# Patient Record
Sex: MALE | Race: WHITE | NOT HISPANIC OR LATINO | URBAN - METROPOLITAN AREA
[De-identification: names, ages, dates, MRNs, and addresses within clinical notes are randomized per-mention and may not be internally consistent; named-entity substitution may affect disease eponyms.]

---

## 2017-09-22 ENCOUNTER — OFFICE VISIT (OUTPATIENT)
Dept: ORTHOPEDIC SURGERY | Facility: CLINIC | Age: 67
End: 2017-09-22

## 2017-09-22 VITALS — BODY MASS INDEX: 33.33 KG/M2 | HEIGHT: 69 IN | WEIGHT: 225 LBS | TEMPERATURE: 98.4 F

## 2017-09-22 DIAGNOSIS — M25.511 CHRONIC RIGHT SHOULDER PAIN: Primary | ICD-10-CM

## 2017-09-22 DIAGNOSIS — M25.522 LEFT ELBOW PAIN: ICD-10-CM

## 2017-09-22 DIAGNOSIS — G89.29 CHRONIC RIGHT SHOULDER PAIN: Primary | ICD-10-CM

## 2017-09-22 PROCEDURE — 99202 OFFICE O/P NEW SF 15 MIN: CPT | Performed by: ORTHOPAEDIC SURGERY

## 2017-09-22 PROCEDURE — 73030 X-RAY EXAM OF SHOULDER: CPT | Performed by: ORTHOPAEDIC SURGERY

## 2017-09-22 PROCEDURE — 73070 X-RAY EXAM OF ELBOW: CPT | Performed by: ORTHOPAEDIC SURGERY

## 2017-09-22 RX ORDER — CITALOPRAM 20 MG/1
TABLET ORAL
COMMUNITY
Start: 2017-08-26

## 2017-09-22 RX ORDER — LISINOPRIL 10 MG/1
TABLET ORAL
COMMUNITY
Start: 2017-08-26

## 2017-09-22 RX ORDER — NITROGLYCERIN 0.4 MG/1
TABLET SUBLINGUAL
COMMUNITY
Start: 2017-08-22

## 2017-09-22 RX ORDER — ATORVASTATIN CALCIUM 20 MG/1
TABLET, FILM COATED ORAL
COMMUNITY
Start: 2017-08-22

## 2017-09-22 RX ORDER — ASPIRIN 81 MG/1
81 TABLET ORAL DAILY
COMMUNITY

## 2017-09-25 NOTE — PROGRESS NOTES
Chief complaint: Right shoulder pain and weakness, left hand weakness and numbness    Mr. Flaherty comes in today for follow-up of his right shoulder and left arm.  These are the issues for which I have seen him in the past.  He tells me that his symptoms are unchanged.  He continues to have mild to moderate aching discomfort in the right shoulder.  Rest and anti-inflammatories do help.  He also comes in today for follow-up of his left hand.  He continues to have numbness and weakness.  His symptoms are no better than when I saw him last.  The primary purpose of his visit today is to get his disability paperwork filled out.  He does not wish to pursue any further intervention or treatment for either of these issues at this time.    Vitals:    09/22/17 1357   Temp: 98.4 °F (36.9 °C)         Current Outpatient Prescriptions:   •  aspirin 81 MG EC tablet, Take 81 mg by mouth Daily., Disp: , Rfl:   •  atorvastatin (LIPITOR) 20 MG tablet, , Disp: , Rfl:   •  citalopram (CeleXA) 20 MG tablet, , Disp: , Rfl:   •  lisinopril (PRINIVIL,ZESTRIL) 10 MG tablet, , Disp: , Rfl:   •  metoprolol tartrate (LOPRESSOR) 25 MG tablet, , Disp: , Rfl:   •  nitroglycerin (NITROSTAT) 0.4 MG SL tablet, , Disp: , Rfl:     Past Medical History:   Diagnosis Date   • Heart attack 1998, 2013   • Hypertension        Past Surgical History:   Procedure Laterality Date   • ELBOW PROCEDURE Left 03/2015   • HAND SURGERY Left     CTR   • SHOULDER SURGERY Right 11/2014       Family History   Problem Relation Age of Onset   • Hypertension Mother    • Hypertension Father        Social History     Social History   • Marital status: Unknown     Spouse name: N/A   • Number of children: N/A   • Years of education: N/A     Occupational History   • Not on file.     Social History Main Topics   • Smoking status: Former Smoker     Years: 20.00     Types: Cigarettes     Quit date: 1993   • Smokeless tobacco: Not on file   • Alcohol use No   • Drug use: Defer   •  Sexual activity: Defer     Other Topics Concern   • Not on file     Social History Narrative   • No narrative on file     A 14 point review of systems is reviewed with the patient.  Pertinent positives are listed above.  All others are negative.      He is awake and alert.  He appears in no acute distress.    On exam, the right shoulder appears benign.  He has full motion.  No tenderness.  He has pain and weakness with elevation in the scapular plane.  Good motor and sensory function of the lower arm.  Palpable radial pulse.    The left hand demonstrates atrophy of the first dorsal interosseous.  He has weakness of the hand with abduction of the fingers and FDP to the small.  Sensation is diminished in the ulnar nerve distribution.      AP, scapular Y, and axillary views of the right shoulder are ordered by myself and reviewed to evaluate the patient's complaint.  These are compared to previous x-rays.  The x-rays show mild glenohumeral osteoarthritis.  The acromiohumeral interval is normal.  Glenoid version appears normal as well.    AP and lateral views of the left elbow are also ordered and reviewed.  These are compared to previous x-rays.  He has mild ulnohumeral osteoarthritis but no other significant findings.    Assessment: 1.  Right rotator cuff tear  2.  Chronic left ulnar nerve palsy    Plan: We discussed his options.  He has no interest in pursuing surgical options for the right shoulder and, unfortunately, there is not much that I can offer him for the chronic left ulnar nerve palsy.  He requested that I fill out his disability paperwork.  I did that for him today.  Going forward, he will follow-up as needed.    Tacho Jones MD

## 2018-01-18 ENCOUNTER — TELEPHONE (OUTPATIENT)
Dept: ORTHOPEDIC SURGERY | Facility: CLINIC | Age: 68
End: 2018-01-18

## 2018-01-29 ENCOUNTER — TELEPHONE (OUTPATIENT)
Dept: ORTHOPEDIC SURGERY | Facility: CLINIC | Age: 68
End: 2018-01-29

## 2022-07-13 ENCOUNTER — HOSPITAL ENCOUNTER (EMERGENCY)
Dept: HOSPITAL 49 - FER | Age: 72
Discharge: HOME | End: 2022-07-13
Payer: COMMERCIAL

## 2022-07-13 DIAGNOSIS — S61.211A: Primary | ICD-10-CM

## 2022-07-13 DIAGNOSIS — I10: ICD-10-CM
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
## 2022-07-13 DIAGNOSIS — Z23: ICD-10-CM
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
## 2022-07-13 DIAGNOSIS — Z79.899: ICD-10-CM

## 2022-08-02 ENCOUNTER — HOSPITAL ENCOUNTER (EMERGENCY)
Dept: HOSPITAL 49 - FER | Age: 72
Discharge: HOME | End: 2022-08-02
Payer: COMMERCIAL

## 2022-08-02 DIAGNOSIS — R41.0: Primary | ICD-10-CM

## 2022-08-02 DIAGNOSIS — I25.2: ICD-10-CM

## 2022-08-02 DIAGNOSIS — Z79.899: ICD-10-CM

## 2022-08-02 DIAGNOSIS — I10: ICD-10-CM

## 2022-08-02 LAB
ALBUMIN SERPL-MCNC: 3.8 G/DL (ref 3.4–5)
ALKALINE PHOSHATASE: 78 U/L (ref 46–116)
ALT SERPL-CCNC: 33 U/L (ref 16–63)
AST: 23 U/L (ref 15–37)
BASOPHIL: 0.3 % (ref 0–2)
BILIRUBIN - TOTAL: 0.4 MG/DL (ref 0.2–1)
BILIRUBIN: NEGATIVE MG/DL
BLOOD: NEGATIVE ERY/UL
BUN SERPL-MCNC: 17 MG/DL (ref 7–18)
BUN/CREAT RATIO (CALC): 21.5 RATIO
CHLORIDE: 104 MMOL/L (ref 98–107)
CLARITY UR: CLEAR
CO2 (BICARBONATE): 26 MMOL/L (ref 21–32)
COLOR: YELLOW
CREATININE: 0.79 MG/DL (ref 0.67–1.17)
EOSINOPHIL: 0.7 % (ref 0–7)
GLOBULIN (CALCULATION): 2.6 G/DL
GLUCOSE (U): NORMAL MG/DL
GLUCOSE SERPL-MCNC: 93 MG/DL (ref 74–106)
HCT: 42.9 % (ref 42–52)
HGB BLD-MCNC: 14.1 G/DL (ref 13.2–18)
LEUKOCYTES: NEGATIVE LEU/UL
LYMPHOCYTE: 22 % (ref 15–48)
MCH RBC QN AUTO: 30.2 PG (ref 25–31)
MCHC RBC AUTO-ENTMCNC: 32.9 G/DL (ref 32–36)
MCV: 91.9 FL (ref 78–100)
MONOCYTE: 7.3 % (ref 0–12)
MPV: 11 FL (ref 6–9.5)
NEUTROPHIL: 69.4 % (ref 41–80)
NITRITE: NEGATIVE MG/DL
NRBC: 0
PLT: 190 K/UL (ref 150–400)
POTASSIUM: 4.2 MMOL/L (ref 3.5–5.1)
PROTEIN: NEGATIVE MG/DL
RBC MORPHOLOGY: NORMAL
RBC: 4.67 M/UL (ref 4.7–6)
RDW: 13.2 % (ref 11.5–14)
SPECIFIC GRAVITY: 1.02 (ref 1–1.03)
TOTAL PROTEIN: 6.4 G/DL (ref 6.4–8.2)
UROBILINOGEN: 0.2 MG/DL (ref 0.2–1)
WBC: 7.5 K/UL (ref 4–10.5)

## 2023-04-03 ENCOUNTER — TRANSCRIBE ORDERS (OUTPATIENT)
Dept: ADMINISTRATIVE | Facility: HOSPITAL | Age: 73
End: 2023-04-03
Payer: COMMERCIAL

## 2023-04-03 DIAGNOSIS — M79.602 LEFT ARM PAIN: ICD-10-CM

## 2023-04-03 DIAGNOSIS — S46.119A STRAIN OF MUSCLE, FASCIA AND TENDON OF LONG HEAD OF BICEPS, UNSPECIFIED ARM, INITIAL ENCOUNTER: Primary | ICD-10-CM

## 2023-04-13 ENCOUNTER — HOSPITAL ENCOUNTER (OUTPATIENT)
Dept: MRI IMAGING | Facility: HOSPITAL | Age: 73
Discharge: HOME | End: 2023-04-13
Admitting: NURSE PRACTITIONER
Payer: OTHER MISCELLANEOUS

## 2023-04-13 DIAGNOSIS — S46.119A STRAIN OF MUSCLE, FASCIA AND TENDON OF LONG HEAD OF BICEPS, UNSPECIFIED ARM, INITIAL ENCOUNTER: ICD-10-CM

## 2023-04-13 DIAGNOSIS — M79.602 LEFT ARM PAIN: ICD-10-CM

## 2023-04-13 PROCEDURE — 73221 MRI JOINT UPR EXTREM W/O DYE: CPT

## 2023-04-24 ENCOUNTER — OFFICE VISIT (OUTPATIENT)
Dept: ORTHOPEDIC SURGERY | Facility: CLINIC | Age: 73
End: 2023-04-24
Payer: OTHER MISCELLANEOUS

## 2023-04-24 VITALS — HEIGHT: 69 IN | WEIGHT: 221 LBS | TEMPERATURE: 97.4 F | BODY MASS INDEX: 32.73 KG/M2

## 2023-04-24 DIAGNOSIS — M75.102 TEAR OF LEFT ROTATOR CUFF, UNSPECIFIED TEAR EXTENT, UNSPECIFIED WHETHER TRAUMATIC: Primary | ICD-10-CM

## 2023-04-24 RX ORDER — LIDOCAINE HYDROCHLORIDE 10 MG/ML
2 INJECTION, SOLUTION EPIDURAL; INFILTRATION; INTRACAUDAL; PERINEURAL
Status: COMPLETED | OUTPATIENT
Start: 2023-04-24 | End: 2023-04-24

## 2023-04-24 RX ORDER — LORATADINE 10 MG/1
10 TABLET ORAL DAILY
COMMUNITY

## 2023-04-24 RX ORDER — METHYLPREDNISOLONE ACETATE 80 MG/ML
80 INJECTION, SUSPENSION INTRA-ARTICULAR; INTRALESIONAL; INTRAMUSCULAR; SOFT TISSUE
Status: COMPLETED | OUTPATIENT
Start: 2023-04-24 | End: 2023-04-24

## 2023-04-24 RX ADMIN — LIDOCAINE HYDROCHLORIDE 2 ML: 10 INJECTION, SOLUTION EPIDURAL; INFILTRATION; INTRACAUDAL; PERINEURAL at 13:02

## 2023-04-24 RX ADMIN — METHYLPREDNISOLONE ACETATE 80 MG: 80 INJECTION, SUSPENSION INTRA-ARTICULAR; INTRALESIONAL; INTRAMUSCULAR; SOFT TISSUE at 13:02

## 2023-04-24 NOTE — PROGRESS NOTES
Patient: Kwesi Flaherty    YOB: 1950    Medical Record Number: [id number]    Chief Complaints:  Left shoulder injury    History of Present Illness:     72 y.o. male patient who presents with a complaint of left shoulder pain and weakness. He reports that he lifted a 20 pound box at work when he felt a sharp stabbing pain in his left shoulder.  The injury happened on .  The shoulder has continued to bother him ever since.  He recently had an MRI.  He has that study available for review today.  Current pain is moderate to severe, constant and throbbing.  He reports weakness whenever he tries to reach or lift.  He reports night pain.    Allergies: No Known Allergies    Home Medications:    Current Outpatient Medications:   •  aspirin 81 MG EC tablet, Take 1 tablet by mouth Daily., Disp: , Rfl:   •  atorvastatin (LIPITOR) 20 MG tablet, , Disp: , Rfl:   •  citalopram (CeleXA) 20 MG tablet, , Disp: , Rfl:   •  lisinopril (PRINIVIL,ZESTRIL) 10 MG tablet, , Disp: , Rfl:   •  loratadine (CLARITIN) 10 MG tablet, Take 1 tablet by mouth Daily., Disp: , Rfl:   •  metoprolol tartrate (LOPRESSOR) 25 MG tablet, , Disp: , Rfl:   •  nitroglycerin (NITROSTAT) 0.4 MG SL tablet, , Disp: , Rfl:     Past Medical History:   Diagnosis Date   • Heart attack ,    • Hypertension        Past Surgical History:   Procedure Laterality Date   • ELBOW PROCEDURE Left 2015   • HAND SURGERY Left     CTR   • SHOULDER SURGERY Right 2014       Social History     Occupational History   • Not on file   Tobacco Use   • Smoking status: Former     Packs/day: 1.00     Years: 20.00     Pack years: 20.00     Types: Cigarettes     Start date:      Quit date:      Years since quittin.3     Passive exposure: Past   • Smokeless tobacco: Never   Vaping Use   • Vaping Use: Never used   Substance and Sexual Activity   • Alcohol use: No   • Drug use: Defer   • Sexual activity: Defer      Social History     Social History  "Narrative   • Not on file       Family History   Problem Relation Age of Onset   • Hypertension Mother    • Hypertension Father        Review of Systems:      Constitutional: Denies fever, shaking or chills   Eyes: Denies change in visual acuity   HEENT: Denies nasal congestion or sore throat   Respiratory: Denies cough or shortness of breath   Cardiovascular: Denies chest pain or edema  Endocrine: Denies tremors, palpitations, intolerance of heat or cold, polyuria, polydipsia.  GI: Denies abdominal pain, nausea, vomiting, bloody stools or diarrhea  : Denies frequency, urgency, incontinence, retention, or nocturia.  Musculoskeletal: Denies numbness, tingling or loss of motor function except as above  Integument: Denies rash, lesion or ulceration   Neurologic: Denies headache or focal weakness, deficits  Heme: Denies spontaneous or excessive bleeding, epistaxis, hematuria, melena, fatigue, enlarged or tender lymph nodes.      All other pertinent positives and negatives as noted above in HPI.    Physical Exam:   72 y.o. male    Vitals:    04/24/23 1156   Temp: 97.4 °F (36.3 °C)   Weight: 100 kg (221 lb)   Height: 175.3 cm (69\")     General:  Patient is awake and alert.  Appears in no acute distress or discomfort.    Psych:  Affect and demeanor are appropriate.    Eyes:  Conjunctiva and sclera appear grossly normal.  Eyes track well and EOM seem to be intact.    Ears:  No gross abnormalities.  Hearing adequate for the exam.    Cardiovascular:  Regular rate and rhythm.    Lungs:  Good chest expansion.  Breathing unlabored.    Lymph:  No palpable adenopathy about neck or axilla.    Neck:  Supple.  Normal ROM.  Negative Spurling's for shoulder or arm pain.    Left upper extremity:  Skin is benign.  No gross abnormalities on inspection including any atrophy, swellings, or masses.  No palpable masses or adenopathy.  Moderate tenderness over the upper biceps groove.  Full shoulder motion.  No evident instability or " apprehension.  Mildly positive Neer Valenzuela. Weakness with forward elevation in the scapular plane.  Positive belly press and bear hugger's.  Good strength in his deltoid, biceps, triceps, and .  Intact sensation throughout the arm.  Brisk cap refill.  Palpable radial pulse.  Good skin turgor.         Radiology:   MRI left shoulder is reviewed along with the associated report.  I have independently interpreted the images.  Findings are listed below.  He has a large rotator cuff tear involving the supraspinatus and subscapularis.  There does not appear to be much atrophy.  He does have moderate glenohumeral arthritis.     IMPRESSION:                 1. Rotator cuff tendinosis with complete retracted tear of the supraspinatus tendon with partial   articular sided tearing of the superior to mid fibers of the subscapularis tendon.  2. Complete retracted tear of the biceps tendon.  3. Mild acromioclavicular and moderate glenohumeral osteoarthritis.  4. Moderate-sized joint effusion.  5. Mild pan labral degenerative tearing with no evidence of a focal displaced tear.    6. Subcoracoid bursitis.    Assessment/Plan:   Left rotator cuff tear with associated osteoarthritis    I showed him the MRI and we discussed the options.  We are going to try treating this conservatively but if that fails we may have to consider an arthroplasty.  I recommend we try an injection today.  The risk, benefits and alternatives were discussed.  He consented and the injection was formed as described below.  I have referred him to PT as well.  I want to see him back in 6 weeks to check his progress.  I am going to keep him on work restrictions in the interim.    Tacho Jones MD    04/24/2023    CC to Emil Jones MD     Large Joint Arthrocentesis: L subacromial bursa  Date/Time: 4/24/2023 1:02 PM  Consent given by: patient  Site marked: site marked  Timeout: Immediately prior to procedure a time out was called to verify the correct  patient, procedure, equipment, support staff and site/side marked as required   Supporting Documentation  Indications: pain   Procedure Details  Location: shoulder - L subacromial bursa  Preparation: Patient was prepped and draped in the usual sterile fashion  Needle gauge: 21 G.  Approach: posterior  Medications administered: 80 mg methylPREDNISolone acetate 80 MG/ML; 2 mL lidocaine PF 1% 1 %  Patient tolerance: patient tolerated the procedure well with no immediate complications

## 2023-04-24 NOTE — LETTER
April 24, 2023     Patient: Kwesi Flaherty   YOB: 1950   Date of Visit: 4/24/2023       To Whom It May Concern:    It is my medical opinion that Kwesi Flaherty may return to light duty immediately with the following restrictions: no pushing, pulling or lifting with the left arm .           Sincerely,        Tacho Jones MD    CC:   No Recipients

## 2023-04-25 ENCOUNTER — TELEPHONE (OUTPATIENT)
Dept: ORTHOPEDIC SURGERY | Facility: CLINIC | Age: 73
End: 2023-04-25

## 2023-04-25 NOTE — TELEPHONE ENCOUNTER
Provider:  DR. RALPH CASTELLANO  Caller:  LIANE MCCALL  Relationship to Patient: SELF    Phone Number:  [phone]  Reason for Call:  PATIENT SAYS HE SPOKE WITH HIS WORK COMP  AND SHE TOLD HIM THAT IF DR. CASTELLANO THINKS THAT SURGERY IS THE ONLY REAL SOLUTION TO HIS LEFT SHOULDER PROBLEM THEN DR. CASTELLANO CAN SEND RECOMMENDATION TO WORK COMP TO REQUEST SURGERY AND BYPASS THERAPY ETC.  When was the patient last seen:  4/24/23

## 2023-04-25 NOTE — TELEPHONE ENCOUNTER
Caller: LIANE MCCALL    Relationship: PATIENT    Best call back number: [phone]    What orders are you requesting (i.e. lab or imaging):  PT ORDER IN [facility] 4/24/23.       Where will you receive your lab/imaging services: PATIENT WOULD LIKE ORDER FAXED TO [facility] @ [phone]    Additional notes:  LOCATION MORE CONVENIENT FOR PATIENT

## 2023-04-27 ENCOUNTER — TELEPHONE (OUTPATIENT)
Dept: ORTHOPEDIC SURGERY | Facility: CLINIC | Age: 73
End: 2023-04-27
Payer: COMMERCIAL

## 2023-04-27 NOTE — TELEPHONE ENCOUNTER
I CALLED AND LEFT A MESSAGE FOR PATIENT THAT ON A REVISION PATIENT HAS TO COMPLETE PHYSICAL THERAPY , THIS NOT A TOTAL REPLACEMENT, IT IS A REVISION AND IN MY EXPERIENCE ALL CONSERVATIVE MEASURES HAVE TO TRIED BEFORE WC WILL APPROVE A REVISION,  IF YOU CAN GET IN WRITING THE ADJUSTOR WILL APPROVE THIS I WILL BE GLAD TO HAVE DR CASTELLANO'S SURGERY SCHEDULER TO SCHEDULE THE SURGERY, PLEASE CALL ME ON Monday AND LET ME KNOW,LLR

## 2023-05-01 NOTE — TELEPHONE ENCOUNTER
PT RETURNED A CALL FROM [employer] REGARDING PREVIOUS MESSAGE. UNABLE TO WARM TRANSFER. PLEASE CALL PATIENT [phone].

## 2023-05-23 ENCOUNTER — TELEPHONE (OUTPATIENT)
Dept: ORTHOPEDIC SURGERY | Facility: CLINIC | Age: 73
End: 2023-05-23
Payer: COMMERCIAL

## 2023-05-23 NOTE — TELEPHONE ENCOUNTER
"PATIENT CALLED AND WANTED TO KNOW IF YOU WOULD SPEAK WITH HIM PERSONALLY FOR A COUPLE OF MINUTES. APPARENTLY HE IS ON LIGHT DUTY RESTRICTIONS AT WORK AND THEY ARE NOW TELLING HIM THEY CANT GIVE HIM ANY HOURS BECAUSE THERE ARE NO LIGHT DUTIES FOR HIM. 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 I DO SEE A NOTE IN HIS CHART FROM YOU SAYING HE SHOULD BE ON LIGHT DUTY. THIS PATIENT SOUNDS VERY CONCERNED AND UPSET. HE IS WILLING TO COME TALK TO YOU TOMORROW AT  IF YOU COULD MAKE TIME FOR HIM.HE THANKED ME FOR RELAYING THE MESSAGE. PLEASE ADVISE  "

## 2023-05-24 ENCOUNTER — TELEPHONE (OUTPATIENT)
Dept: ORTHOPEDIC SURGERY | Facility: HOSPITAL | Age: 73
End: 2023-05-24
Payer: COMMERCIAL

## 2023-06-05 ENCOUNTER — TELEPHONE (OUTPATIENT)
Dept: ORTHOPEDIC SURGERY | Facility: CLINIC | Age: 73
End: 2023-06-05

## 2023-06-05 ENCOUNTER — PREP FOR SURGERY (OUTPATIENT)
Dept: ORTHOPEDIC SURGERY | Facility: CLINIC | Age: 73
End: 2023-06-05

## 2023-06-05 ENCOUNTER — OFFICE VISIT (OUTPATIENT)
Dept: ORTHOPEDIC SURGERY | Facility: CLINIC | Age: 73
End: 2023-06-05
Payer: OTHER MISCELLANEOUS

## 2023-06-05 VITALS — BODY MASS INDEX: 32.44 KG/M2 | WEIGHT: 226.6 LBS | TEMPERATURE: 98 F | HEIGHT: 70 IN

## 2023-06-05 DIAGNOSIS — M75.102 TEAR OF LEFT ROTATOR CUFF, UNSPECIFIED TEAR EXTENT, UNSPECIFIED WHETHER TRAUMATIC: Primary | ICD-10-CM

## 2023-06-05 PROCEDURE — 99214 OFFICE O/P EST MOD 30 MIN: CPT | Performed by: ORTHOPAEDIC SURGERY

## 2023-06-05 RX ORDER — MELOXICAM 7.5 MG/1
15 TABLET ORAL ONCE
OUTPATIENT
Start: 2023-06-05 | End: 2023-06-05

## 2023-06-05 RX ORDER — ACETAMINOPHEN 325 MG/1
1000 TABLET ORAL ONCE
OUTPATIENT
Start: 2023-06-05 | End: 2023-06-05

## 2023-06-05 RX ORDER — PREGABALIN 150 MG/1
150 CAPSULE ORAL ONCE
OUTPATIENT
Start: 2023-06-05 | End: 2023-06-05

## 2023-06-05 NOTE — PROGRESS NOTES
Patient:Kwesi Flaherty    YOB: 1950    Medical Record Number:[id number]    Chief Complaints: Follow-up left shoulder pain    History of Present Illness:     73 y.o. male patient who presents for follow up of his left shoulder.  He completed formal physical therapy.  It has not helped.  He says that his therapist told him that they were not making any progress.  He reports constant severe pain.  He reports inability to reach or lift overhead without severe pain.  He reports night pain and trouble sleeping.  He is very frustrated.    Allergies:No Known Allergies    Home Medications:    Current Outpatient Medications:     aspirin 81 MG EC tablet, Take 1 tablet by mouth Daily., Disp: , Rfl:     atorvastatin (LIPITOR) 20 MG tablet, , Disp: , Rfl:     citalopram (CeleXA) 20 MG tablet, , Disp: , Rfl:     lisinopril (PRINIVIL,ZESTRIL) 10 MG tablet, , Disp: , Rfl:     loratadine (CLARITIN) 10 MG tablet, Take 1 tablet by mouth Daily., Disp: , Rfl:     metoprolol tartrate (LOPRESSOR) 25 MG tablet, , Disp: , Rfl:     nitroglycerin (NITROSTAT) 0.4 MG SL tablet, , Disp: , Rfl:     Past Medical History:   Diagnosis Date    Heart attack ,     Hypertension        Past Surgical History:   Procedure Laterality Date    ELBOW PROCEDURE Left 2015    HAND SURGERY Left     CTR    SHOULDER SURGERY Right 2014       Social History     Occupational History    Not on file   Tobacco Use    Smoking status: Former     Packs/day: 1.00     Years: 20.00     Pack years: 20.00     Types: Cigarettes     Start date:      Quit date:      Years since quittin.4     Passive exposure: Past    Smokeless tobacco: Never   Vaping Use    Vaping Use: Never used   Substance and Sexual Activity    Alcohol use: No    Drug use: Never    Sexual activity: Defer      Social History     Social History Narrative    Not on file       Family History   Problem Relation Age of Onset    Hypertension Mother     Hypertension Father   "      Review of Systems:      Constitutional: Denies fever, shaking or chills   Eyes: Denies change in visual acuity   HEENT: Denies nasal congestion or sore throat   Respiratory: Denies cough or shortness of breath   Cardiovascular: Denies chest pain or edema  Endocrine: Denies tremors, palpitations, intolerance of heat or cold, polyuria, polydipsia.  GI: Denies abdominal pain, nausea, vomiting, bloody stools or diarrhea  : Denies frequency, urgency, incontinence, retention, or nocturia.  Musculoskeletal: Denies numbness, tingling or loss of motor function except as above  Integument: Denies rash, lesion or ulceration   Neurologic: Denies headache or focal weakness, deficits  Heme: Denies spontaneous or excessive bleeding, epistaxis, hematuria, melena, fatigue, enlarged or tender lymph nodes.      All other pertinent positives and negatives as noted above in HPI.    Physical Exam:73 y.o. male  Vitals:    06/05/23 0823   Temp: 98 °F (36.7 °C)   Weight: 103 kg (226 lb 9.6 oz)   Height: 177.8 cm (70\")       General:  Patient is awake and alert.  Appears in no acute distress or discomfort.    Psych:  Affect and demeanor are appropriate.    Extremities: Left shoulder is examined.  Skin is benign.  Mild to moderate anterior tenderness.  No effusion.  He has good motion.  He has weakness with forward elevation in the scapular plane and abduction.  Positive belly press and bear hugger's.  Deltoid fires.  Normal axillary nerve sensation.    Imaging: AP, scapular Y and axial views left shoulder are ordered and reviewed to evaluate his complaint.  No comparison films are immediately available.  He has a small cyst in the caudal glenoid and some early osteophyte formation there.  No other significant findings noted.  Acromiohumeral interval is normal.    MRI left shoulder is reviewed along with the associated report.  He has a full-thickness tear of the supraspinatus and some associated cuff tendinopathy.  He appears to have " moderate glenohumeral arthritis.  No other significant findings.    Assessment/Plan: Left rotator cuff tear with associated osteoarthritis    I showed him his x-rays and MRI.  In my opinion he is really kind about an in between point.  We could potentially try a repair.  He does have some arthritis and even if the repair heals, the arthritis may continue to cause him some pain.  The alternative option is a reverse.  I feel like that is probably a little aggressive but it is more predictable in terms of addressing both the tear and the arthritis.    We had a long discussion about both options.  We talked about the pros and cons of each.  He went through a repair on the right side and it was a very difficult recovery.  He is still having some problems with the right and he does not want to go through that with the left.  He wants to pursue the replacement.  Given his failure of conservative treatment, I do consider that the reverse arthroplasty is reasonable for him.  The risk, benefits and alternatives were thoroughly discussed.  He acknowledged understanding of this information.  We will try to get this approved.  I will then have my  contact him to set this up.  He will follow-up with myself or Radha for a preoperative visit.    Tacho Jones MD    06/05/2023

## 2023-06-05 NOTE — TELEPHONE ENCOUNTER
Caller: Kwesi Flaherty    Relationship to patient: Self    Best call back number: [phone]    Chief complaint: LEFT SHOULDER PAIN    Type of visit: SCHEDULE SX, IF W/C APPROVES FOR REPLACEMENT    Requested date: ASAP      Additional notes: PATIENT HAD APPT TODAY W. DR. CASTELLANO. HE CALLED BACK IN TO STATE HE IS READY TO HAVE SHOULDER REPLACEMENT, IF W/C APPROVES PROCEDURE. PLEASE CALL BACK TO ADVISE

## 2023-06-06 NOTE — TELEPHONE ENCOUNTER
Caller: Kwesi Flaherty    Relationship to patient: Self    Best call back number: [phone]    Patient is needing: PATIENT CALLING BACK SAYING HE JUST MISSED A CALL, ASSUMING IT WAS FOR SX SCHEDULING.

## 2023-06-08 ENCOUNTER — TELEPHONE (OUTPATIENT)
Dept: ORTHOPEDIC SURGERY | Facility: CLINIC | Age: 73
End: 2023-06-08

## 2023-08-07 ENCOUNTER — OFFICE VISIT (OUTPATIENT)
Dept: ORTHOPEDIC SURGERY | Facility: CLINIC | Age: 73
End: 2023-08-07
Payer: OTHER MISCELLANEOUS

## 2023-08-07 VITALS — HEIGHT: 70 IN | WEIGHT: 222.6 LBS | BODY MASS INDEX: 31.87 KG/M2 | TEMPERATURE: 98 F

## 2023-08-07 DIAGNOSIS — M75.102 TEAR OF LEFT ROTATOR CUFF, UNSPECIFIED TEAR EXTENT, UNSPECIFIED WHETHER TRAUMATIC: Primary | ICD-10-CM

## 2023-08-07 DIAGNOSIS — Z09 SURGERY FOLLOW-UP: ICD-10-CM

## 2023-08-07 NOTE — LETTER
August 7, 2023     Patient: Kwesi Flaherty   YOB: 1950   Date of Visit: 8/7/2023       To Whom It May Concern:    It is my medical opinion that Kwesi Flaherty should remain out of work until next office visit .           Sincerely,        Tacho Jones MD    CC:   No Recipients

## 2023-08-07 NOTE — PROGRESS NOTES
"Kwesi Flaherty : 1950 MRN: [id number] DATE: 2023    DIAGNOSIS: 6 week follow up left shoulder arthroplasty      SUBJECTIVE:  Mr. Flaherty returns today for 6 week follow up of left shoulder replacement. He reports doing well with no unusual complaints.     OBJECTIVE:    Temp 98 øF (36.7 øC)   Ht 177.8 cm (70\")   Wt 101 kg (222 lb 9.6 oz)   BMI 31.94 kg/mý     Exam: The incision is well healed. No erythema or drainage. Shoulder moves fluidly with pendulums.  Motion is on track per protocol.  The arm is soft and nontender.  Good motor and sensory function.  Palpable distal pulses.     DIAGNOSTIC STUDIES    Xrays: AP and scapular Y views of the left shoulder are ordered and reviewed for evaluation of shoulder replacement.  They demonstrate a well positioned, well aligned replacement without complicating factors noted.  In comparison with previous films there has been no change.    ASSESSMENT: 6 week follow up left shoulder replacement    PLAN:   1.  Continue PT per protocol.  2.  Discontinue sling and begin working on progressing ROM as tolerated.  3.  Counseled patient about appropriate activity modifications and restrictions.  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    Tacho Jones MD    2023    "

## 2023-09-18 ENCOUNTER — OFFICE VISIT (OUTPATIENT)
Dept: ORTHOPEDIC SURGERY | Facility: CLINIC | Age: 73
End: 2023-09-18
Payer: OTHER MISCELLANEOUS

## 2023-09-18 VITALS — HEIGHT: 70 IN | WEIGHT: 225.6 LBS | BODY MASS INDEX: 32.3 KG/M2 | TEMPERATURE: 97.8 F

## 2023-09-18 DIAGNOSIS — Z09 SURGERY FOLLOW-UP: Primary | ICD-10-CM

## 2023-09-18 PROCEDURE — 99024 POSTOP FOLLOW-UP VISIT: CPT | Performed by: ORTHOPAEDIC SURGERY

## 2023-09-18 PROCEDURE — 73030 X-RAY EXAM OF SHOULDER: CPT | Performed by: ORTHOPAEDIC SURGERY

## 2023-09-18 NOTE — LETTER
September 18, 2023     Patient: Kwesi Flaherty   YOB: 1950   Date of Visit: 9/18/2023       To Whom It May Concern:    It is my medical opinion that Kwesi Flaherty may return to light duty immediately with the following restrictions: restricted from use of left arm.  One armed duty only with the right arm.           Sincerely,        Tacho Jones MD    CC:   No Recipients

## 2023-09-18 NOTE — PROGRESS NOTES
"Kwesi Flaherty : 1950 MRN: [id number] DATE: 2023    DIAGNOSIS: 3 month follow up left shoulder arthroplasty      SUBJECTIVE:  Patient returns today for 3 month follow up of left shoulder replacement.  Patient reports significant discomfort. Still in PT and reports making slow progress.    OBJECTIVE:    Temp 97.8 °F (36.6 °C)   Ht 177.8 cm (70\")   Wt 102 kg (225 lb 9.6 oz)   BMI 32.37 kg/m²     Review of Systems negative except as above    Exam:  The incision is well healed. No effusion.  Range of motion:  .  ER 40.  IR to between his side and back pockets. The arm is soft and nontender.  5-/5 strength with elevation and abduction.  Sensation intact distally.  Brisk cap refill.    DIAGNOSTIC STUDIES    Xrays: AP, scapular Y views of the left shoulder are ordered and reviewed for evaluation of shoulder replacement. They demonstrate a well positioned, well aligned replacement without complicating factors noted.  In comparison with previous films there has been no change.    ASSESSMENT:  3 month follow up left shoulder replacement.    PLAN:   1.  Continue appropriate activity modifications and restrictions as discussed  2.  Continue PT per protocol.  3.  I explained that one can expect continued improvement in motion, function, and any residual discomfort for up to 1 year after surgery.  4.  Follow up in 6 weeks  5.  Antibiotic prophylaxis discussed    Tacho Jones MD    2023    "

## 2023-10-30 ENCOUNTER — OFFICE VISIT (OUTPATIENT)
Dept: ORTHOPEDIC SURGERY | Facility: CLINIC | Age: 73
End: 2023-10-30
Payer: OTHER MISCELLANEOUS

## 2023-10-30 VITALS — WEIGHT: 226.3 LBS | BODY MASS INDEX: 32.4 KG/M2 | HEIGHT: 70 IN | TEMPERATURE: 93.8 F

## 2023-10-30 DIAGNOSIS — Z09 SURGERY FOLLOW-UP: Primary | ICD-10-CM

## 2023-10-30 PROCEDURE — 73030 X-RAY EXAM OF SHOULDER: CPT | Performed by: ORTHOPAEDIC SURGERY

## 2023-10-30 PROCEDURE — 99212 OFFICE O/P EST SF 10 MIN: CPT | Performed by: ORTHOPAEDIC SURGERY

## 2023-10-30 NOTE — PROGRESS NOTES
CC:  Follow up left reverse total shoulder    Mr. Flaherty is now 4 months out from his left reverse total shoulder arthroplasty.  He says the shoulder is doing okay but not great.  His biggest complaint is persistent weakness.  Of note, he quit his job since I last saw him.    Left shoulder: Incision is healed.  No tenderness or effusion.  Passive motion is excellent.  Active motion is overall good but he still has weakness with abduction and elevation.    AP, scapular Y and axillary views left shoulder are ordered and reviewed evaluate his implants.  These are compared to previous x-rays.  Implants are well fixed and well-positioned.  No complicating process noted.    Assessment: 4 months status post left reverse total shoulder arthroplasty    Plan: He seems to be doing okay but not great.  He still has a lot of work to do with respect to his strength.  I want him to continue PT.  I will see him back in another 2 months.  I expect he will be at [facility] at that point.    Tacho Jones MD

## 2023-11-27 ENCOUNTER — HOSPITAL ENCOUNTER (OUTPATIENT)
Dept: GENERAL RADIOLOGY | Facility: HOSPITAL | Age: 73
Discharge: HOME | End: 2023-11-27
Admitting: CLINIC/CENTER
Payer: OTHER GOVERNMENT

## 2023-11-27 ENCOUNTER — TRANSCRIBE ORDERS (OUTPATIENT)
Dept: GENERAL RADIOLOGY | Facility: HOSPITAL | Age: 73
End: 2023-11-27
Payer: OTHER GOVERNMENT

## 2023-11-27 DIAGNOSIS — M25.562 ACUTE BILATERAL KNEE PAIN: ICD-10-CM

## 2023-11-27 DIAGNOSIS — M25.561 ACUTE BILATERAL KNEE PAIN: Primary | ICD-10-CM

## 2023-11-27 DIAGNOSIS — M25.561 ACUTE BILATERAL KNEE PAIN: ICD-10-CM

## 2023-11-27 DIAGNOSIS — M25.562 ACUTE BILATERAL KNEE PAIN: Primary | ICD-10-CM

## 2023-11-27 PROCEDURE — 73564 X-RAY EXAM KNEE 4 OR MORE: CPT

## 2024-01-31 ENCOUNTER — OFFICE VISIT (OUTPATIENT)
Dept: ORTHOPEDIC SURGERY | Facility: CLINIC | Age: 74
End: 2024-01-31
Payer: OTHER MISCELLANEOUS

## 2024-01-31 VITALS — WEIGHT: 233.1 LBS | TEMPERATURE: 98.6 F | HEIGHT: 70 IN | BODY MASS INDEX: 33.37 KG/M2

## 2024-01-31 DIAGNOSIS — M19.012 ARTHRITIS OF LEFT ACROMIOCLAVICULAR JOINT: ICD-10-CM

## 2024-01-31 DIAGNOSIS — Z09 SURGERY FOLLOW-UP: ICD-10-CM

## 2024-01-31 DIAGNOSIS — M75.102 TEAR OF LEFT ROTATOR CUFF, UNSPECIFIED TEAR EXTENT, UNSPECIFIED WHETHER TRAUMATIC: Primary | ICD-10-CM

## 2024-01-31 RX ORDER — METHYLPREDNISOLONE ACETATE 80 MG/ML
80 INJECTION, SUSPENSION INTRA-ARTICULAR; INTRALESIONAL; INTRAMUSCULAR; SOFT TISSUE
Status: COMPLETED | OUTPATIENT
Start: 2024-01-31 | End: 2024-01-31

## 2024-01-31 RX ORDER — LIDOCAINE HYDROCHLORIDE 20 MG/ML
2 INJECTION, SOLUTION EPIDURAL; INFILTRATION; INTRACAUDAL; PERINEURAL
Status: COMPLETED | OUTPATIENT
Start: 2024-01-31 | End: 2024-01-31

## 2024-01-31 RX ADMIN — METHYLPREDNISOLONE ACETATE 80 MG: 80 INJECTION, SUSPENSION INTRA-ARTICULAR; INTRALESIONAL; INTRAMUSCULAR; SOFT TISSUE at 09:14

## 2024-01-31 RX ADMIN — LIDOCAINE HYDROCHLORIDE 2 ML: 20 INJECTION, SOLUTION EPIDURAL; INFILTRATION; INTRACAUDAL; PERINEURAL at 09:14

## 2024-01-31 NOTE — PROGRESS NOTES
Chief complaint: Follow-up status post left reverse total shoulder arthroplasty    Mr. Flaherty is now 7 months out from his left reverse total shoulder arthroplasty.  He reports persistent pain in the left shoulder, particularly over the top and front.  It is worse whenever he tries to reach or lift.  He still feels like the shoulder is stiff.    His surgical incision is healed.  No effusion or increased warmth.  He has focal fairly exquisite tenderness at the acromioclavicular joint.  He has pain in this area with an active compression maneuver.  He is active motion remains limited.  Passive motion is better but he seems to have a lot of discomfort with even passive motion.    AP, scapular Y and axillary views left shoulder are ordered and reviewed.  These compared to previous x-rays.  No change with respect to the implants.  Everything appears well-positioned and well-fixed.  He does have advanced acromioclavicular arthritis.    Assessment: 7-month status post left reverse total shoulder arthroplasty with symptomatic AC arthritis    Plan: I suspect that a lot of his residual pain is coming from the acromioclavicular joint.  His motion is poor and I would have expected better at this point.  I recommend we try an injection for both diagnostic and therapeutic purposes.  I would really like to see to what degree this helps.  The risk, benefits and alternatives were discussed.  He acknowledged understanding and consented.  Under sterile conditions and without complication, the injection was performed as described below.  I would like to see him back in 2 months to check his progress.  If he does not see much benefit from this injection then we may have to consider further workup with a bone scan to rule out aseptic loosening or other complicating process.  He needs to continue his current work restrictions including no overhead or lifting greater than 5 pounds.    Tacho Jones MD      Medium Joint Arthrocentesis:  L acromioclavicular  Date/Time: 1/31/2024 9:14 AM  Consent given by: patient  Site marked: site marked  Timeout: Immediately prior to procedure a time out was called to verify the correct patient, procedure, equipment, support staff and site/side marked as required   Supporting Documentation  Indications: pain   Procedure Details  Location: shoulder - L acromioclavicular  Preparation: Patient was prepped and draped in the usual sterile fashion  Needle size: 25 G  Approach: superior  Medications administered: 2 mL lidocaine PF 2% 2 %; 80 mg methylPREDNISolone acetate 80 MG/ML  Patient tolerance: patient tolerated the procedure well with no immediate complications

## 2024-02-13 ENCOUNTER — TELEPHONE (OUTPATIENT)
Dept: ORTHOPEDIC SURGERY | Facility: CLINIC | Age: 74
End: 2024-02-13

## 2024-02-13 NOTE — TELEPHONE ENCOUNTER
Caller: RENETTA    Relationship: [employer]    Best call back number:     What form or medical record are you requesting: OFFICE NOTES AND WORK STATUS FROM 1/31/24        How would you like to receive the form or medical records (pick-up, mail, fax): FAX  If fax, what is the fax number:       Additional notes: HUNG UP BEFORE I COULD GET FAX NUMBER

## 2024-04-10 ENCOUNTER — OFFICE VISIT (OUTPATIENT)
Dept: ORTHOPEDIC SURGERY | Facility: CLINIC | Age: 74
End: 2024-04-10
Payer: OTHER MISCELLANEOUS

## 2024-04-10 VITALS — TEMPERATURE: 98.7 F | BODY MASS INDEX: 33.56 KG/M2 | HEIGHT: 70 IN | WEIGHT: 234.4 LBS

## 2024-04-10 DIAGNOSIS — M19.012 ARTHRITIS OF LEFT ACROMIOCLAVICULAR JOINT: Primary | ICD-10-CM

## 2024-04-10 DIAGNOSIS — G89.29 CHRONIC LEFT SHOULDER PAIN: ICD-10-CM

## 2024-04-10 DIAGNOSIS — M25.512 CHRONIC LEFT SHOULDER PAIN: ICD-10-CM

## 2024-04-10 NOTE — PROGRESS NOTES
Chief Complaint: Painful left shoulder replacement    HPI: Kwesi follows up for his left shoulder today.  He says the injection helped for maybe a few hours at best.  He says his pain is recurred.  Pain is predominantly over the front of the shoulder but he says it also hurts in the front and down the side.  It is worse especially when he reaches out to the side and tries to lift something.  He denies any fevers, chills, sweats or other associated symptoms.    Exam: Left shoulder is examined.  Skin is benign.  He remains focally tender over the acromioclavicular joint.  Today he also has tenderness at the conjoined tendon and the deltoid insertion.  There is no effusion or increased warmth.  He can range the shoulder fairly well but it is uncomfortable for him.  All resistive testing of his musculature is uncomfortable as well.    Imaging: I again reviewed his previous x-rays.  He has advanced acromioclavicular arthritis.  I do not see any complicating process with respect to his implants.    Assessment: Painful left reverse total shoulder arthroplasty, symptomatic acromioclavicular arthritis    Plan: Today he is complaining of pain in multiple spots, not just the acromioclavicular joint.  Based on his exam, the AC joint seems to be the epicenter of his discomfort but he is having pain all over.  We talked about options.  Have agreed to refer him for a bone scan to look for aseptic loosening or infection.  The scan should show significant active inflammation of the acromioclavicular joint as well.  I told him that the options for his acromioclavicular joint are relatively straightforward.  If there is another complicating process though, that may be a much more complicated issue.  I told him I will call him once I see the results.    Tacho Jones MD  04/10/2024

## 2024-04-23 ENCOUNTER — HOSPITAL ENCOUNTER (OUTPATIENT)
Dept: NUCLEAR MEDICINE | Facility: HOSPITAL | Age: 74
Discharge: HOME | End: 2024-04-23
Payer: OTHER MISCELLANEOUS

## 2024-04-23 DIAGNOSIS — M25.512 CHRONIC LEFT SHOULDER PAIN: ICD-10-CM

## 2024-04-23 DIAGNOSIS — G89.29 CHRONIC LEFT SHOULDER PAIN: ICD-10-CM

## 2024-04-23 DIAGNOSIS — M19.012 ARTHRITIS OF LEFT ACROMIOCLAVICULAR JOINT: ICD-10-CM

## 2024-04-23 PROCEDURE — A9503 TC99M MEDRONATE: HCPCS | Performed by: ORTHOPAEDIC SURGERY

## 2024-04-23 PROCEDURE — 78315 BONE IMAGING 3 PHASE: CPT

## 2024-04-23 PROCEDURE — 0 TECHNETIUM MEDRONATE KIT: Performed by: ORTHOPAEDIC SURGERY

## 2024-04-23 RX ORDER — TC 99M MEDRONATE 20 MG/10ML
20.4 INJECTION, POWDER, LYOPHILIZED, FOR SOLUTION INTRAVENOUS
Status: COMPLETED | OUTPATIENT
Start: 2024-04-23 | End: 2024-04-23

## 2024-04-23 RX ADMIN — Medication 20.4 MILLICURIE: at 10:10

## 2024-04-24 ENCOUNTER — TELEPHONE (OUTPATIENT)
Dept: ORTHOPEDIC SURGERY | Facility: CLINIC | Age: 74
End: 2024-04-24
Payer: OTHER GOVERNMENT

## 2024-04-24 NOTE — TELEPHONE ENCOUNTER
I called and spoke with him regarding his recent bone scan.  I do not see anything on the scan that looks concerning.  He does appear to have some signal in the acromioclavicular joint consistent with arthritis.  No signal around the implants.    We talked about his options.  I think it is very unlikely he has an occult infection but it is still a possibility.  I explained that the best way to explore this possibility would be an arthroscopic synovectomy and biopsy to look for P acnes.  We discussed this option.  He is not interested in pursuing that option at this time.  He would like to give the shoulder a bit more time.  I told him I think that is perfectly reasonable.  I will set him up an appointment to see me again in 6 months.  I will have my office reach out to him to set up this appointment.    I do consider that he is at maximal medical improvement at this point.  It is possible we will need to revisit the option of an arthroscopic biopsy in 6 months if he is no better; however, at this point I do not anticipate any surgery for him in the near future.  He will have a permanent partial impairment.  I told him I will be happy to complete a rating for him if requested..

## 2024-10-14 ENCOUNTER — OFFICE VISIT (OUTPATIENT)
Dept: ORTHOPEDIC SURGERY | Facility: CLINIC | Age: 74
End: 2024-10-14
Payer: OTHER MISCELLANEOUS

## 2024-10-14 VITALS — HEIGHT: 70 IN | BODY MASS INDEX: 33.33 KG/M2 | WEIGHT: 232.8 LBS | TEMPERATURE: 98.2 F

## 2024-10-14 DIAGNOSIS — M19.012 ARTHRITIS OF LEFT ACROMIOCLAVICULAR JOINT: Primary | ICD-10-CM

## 2024-10-14 NOTE — PROGRESS NOTES
Chief Complaint:  Painful left RTSA    HPI:  Mr. Flaherty follows up for his left shoulder.  He did get the bone scan.  He continues to have pain in the front and top of the shoulder.  The pain is worse with certain reaching and lifting movements.    Exam: Left shoulder is examined.  Skin is benign.  Moderate tenderness over the acromioclavicular joint.  He has mild tenderness at the conjoined tendon as well but this is not exquisite.  His motion is good.  Internal rotation is limited to roughly his back pocket but all other planes are excellent.  He has a positive active compression maneuver.    Imaging: No new x-rays taken today.  Previous x-rays are reviewed and show no obvious abnormalities.  His implants look fine.  The bone scan is reviewed and shows no abnormalities with respect to his implants.  The radiologist did not comment on it but it appears to me that he does have some signal at the acromioclavicular joint.    Assessment: Symptomatic left acromioclavicular arthritis status post left reverse total shoulder arthroplasty    Plan: He does have a component of conjoined tendon irritation but I think the majority of his pain is probably coming from the acromioclavicular joint.  We talked about his options including continued conservative treatment versus open resection of the distal clavicle.  He says that his pain is manageable and he is not interested in pursuing surgical options at this time.  I told him to call me if he changes his mind.  Going forward, he can follow-up with me as needed.    Tacho Jones MD  10/14/2024

## 2024-10-22 ENCOUNTER — TELEPHONE (OUTPATIENT)
Dept: ORTHOPEDIC SURGERY | Facility: CLINIC | Age: 74
End: 2024-10-22

## 2024-10-22 NOTE — TELEPHONE ENCOUNTER
Spoke with patient and he is going to contact  to fax me a formal request for the impairment rating paperwork he is asking about in his phone message.

## 2024-10-22 NOTE — TELEPHONE ENCOUNTER
Caller: Kwesi Flaherty    Relationship: Self    Best call back number:     What is the best time to reach you: ANY     Who are you requesting to speak with (clinical staff, provider,  specific staff member): CLINICAL    What was the call regarding: PATIENT WANTED TO MAKE SURE THE NURSE THAT HELPED HIM ON 10/14/24 HAS HIS PAPERWORK TURNED IN THAT HE GAVE THEM     Is it okay if the provider responds through MyChart: PLEASE CALL

## 2024-10-24 NOTE — TELEPHONE ENCOUNTER
Spoke with patient and advised I do not have any impairment rating paperwork from his WC carrier yet.

## 2024-10-24 NOTE — TELEPHONE ENCOUNTER
Provider: GRAY    Caller: PATIENT    Phone Number: [phone]    Reason for Call: PATIENT IS ASKING IF THE PAPERWORK FOR HIS IMPAIRMENT RATING WAS RECEIVED FROM WORK [employer]. HE WOULD LIKE A CALL BACK PLEASE.

## 2024-11-05 ENCOUNTER — TELEPHONE (OUTPATIENT)
Dept: ORTHOPEDIC SURGERY | Facility: CLINIC | Age: 74
End: 2024-11-05

## 2024-11-05 NOTE — TELEPHONE ENCOUNTER
Provider: GRAY    Caller: LIANE    Relationship to Patient: SELF    Pharmacy:     Phone Number: [phone]    Reason for Call: PT IS CALLING TO SPEAK TO A NURSE ABOUT THE LAST REPORT THAT DR CASTELLANO WAS SUPPOSED TO SEND TO [facility] AND THAT HE NEEDS TO DISCUSS THIS WITH DR CASTELLANO

## 2024-11-06 NOTE — TELEPHONE ENCOUNTER
Hub staff attempted to follow warm transfer process and was unsuccessful     Caller: Kwesi Flaherty    Relationship to patient: Self    Best call back number: [phone]    Patient is needing: PT REQUESTING CALL BACK TO SPEAK ABOUT WC

## 2024-11-06 NOTE — TELEPHONE ENCOUNTER
Spoke with patient and he is wanting an impairment rating so I advised him [facility] will need to send us a request if they need it and I will invoice them.

## 2024-11-06 NOTE — TELEPHONE ENCOUNTER
Left voicemail for patient to contact me with adjustor name and fax number.  I did not have a request for notes by WC.

## 2024-11-25 ENCOUNTER — TELEPHONE (OUTPATIENT)
Dept: ORTHOPEDIC SURGERY | Facility: CLINIC | Age: 74
End: 2024-11-25

## 2024-11-25 NOTE — TELEPHONE ENCOUNTER
Provider: GRAY    Caller: Kwesi Flaherty    Relationship to Patient: Self    Pharmacy: NA    Phone Number: [phone]    Reason for Call: PATIENT WOULD LIKE AN IMPAIRMENT RATING FROM DR CASTELLANO    When was the patient last seen: 10.14.24

## 2024-11-26 NOTE — TELEPHONE ENCOUNTER
I have contacted [facility] by voicemail and advised if they are requesting a rating a formal request will need to be faxed to the office.

## 2025-03-05 ENCOUNTER — TRANSCRIBE ORDERS (OUTPATIENT)
Dept: ADMINISTRATIVE | Facility: HOSPITAL | Age: 75
End: 2025-03-05
Payer: OTHER GOVERNMENT

## 2025-03-05 DIAGNOSIS — M25.572 PAIN IN JOINT OF LEFT FOOT: Primary | ICD-10-CM

## 2025-04-10 ENCOUNTER — TRANSCRIBE ORDERS (OUTPATIENT)
Dept: ADMINISTRATIVE | Facility: HOSPITAL | Age: 75
End: 2025-04-10
Payer: OTHER GOVERNMENT

## 2025-04-10 DIAGNOSIS — M84.376A STRESS FRACTURE OF METATARSAL BONE, UNSPECIFIED LATERALITY, INITIAL ENCOUNTER: Primary | ICD-10-CM

## 2025-04-28 ENCOUNTER — HOSPITAL ENCOUNTER (OUTPATIENT)
Dept: MRI IMAGING | Facility: HOSPITAL | Age: 75
Discharge: HOME | End: 2025-04-28
Admitting: PODIATRIST
Payer: OTHER GOVERNMENT

## 2025-04-28 DIAGNOSIS — M84.376A STRESS FRACTURE OF METATARSAL BONE, UNSPECIFIED LATERALITY, INITIAL ENCOUNTER: ICD-10-CM

## 2025-04-28 PROCEDURE — 73718 MRI LOWER EXTREMITY W/O DYE: CPT
